# Patient Record
Sex: FEMALE | Employment: PART TIME | URBAN - METROPOLITAN AREA
[De-identification: names, ages, dates, MRNs, and addresses within clinical notes are randomized per-mention and may not be internally consistent; named-entity substitution may affect disease eponyms.]

---

## 2022-03-08 ENCOUNTER — HOSPITAL ENCOUNTER (INPATIENT)
Age: 22
LOS: 1 days | Discharge: HOME | DRG: 881 | End: 2022-03-09
Attending: PSYCHIATRY & NEUROLOGY | Admitting: PSYCHIATRY & NEUROLOGY
Payer: MEDICARE

## 2022-03-08 PROBLEM — F32.A DEPRESSION, UNSPECIFIED: Status: ACTIVE | Noted: 2022-03-08

## 2022-03-08 PROCEDURE — 6370000000 HC RX 637 (ALT 250 FOR IP): Performed by: NURSE PRACTITIONER

## 2022-03-08 PROCEDURE — 1240000000 HC EMOTIONAL WELLNESS R&B

## 2022-03-08 PROCEDURE — 5130000000 HC BRIDGE APPOINTMENT

## 2022-03-08 RX ORDER — DULOXETINE 40 MG/1
40 CAPSULE, DELAYED RELEASE ORAL DAILY
COMMUNITY
Start: 2022-02-16

## 2022-03-08 RX ORDER — METRONIDAZOLE 500 MG/1
500 TABLET ORAL 2 TIMES DAILY
COMMUNITY
Start: 2022-03-08 | End: 2022-03-15

## 2022-03-08 RX ORDER — POLYETHYLENE GLYCOL 3350 17 G
2 POWDER IN PACKET (EA) ORAL
Status: DISCONTINUED | OUTPATIENT
Start: 2022-03-08 | End: 2022-03-10 | Stop reason: HOSPADM

## 2022-03-08 RX ORDER — METRONIDAZOLE 250 MG/1
500 TABLET ORAL 2 TIMES DAILY
Status: DISCONTINUED | OUTPATIENT
Start: 2022-03-08 | End: 2022-03-10 | Stop reason: HOSPADM

## 2022-03-08 RX ORDER — ACETAMINOPHEN 325 MG/1
650 TABLET ORAL EVERY 4 HOURS PRN
Status: DISCONTINUED | OUTPATIENT
Start: 2022-03-08 | End: 2022-03-10 | Stop reason: HOSPADM

## 2022-03-08 RX ORDER — TRAZODONE HYDROCHLORIDE 50 MG/1
50 TABLET ORAL NIGHTLY PRN
Status: DISCONTINUED | OUTPATIENT
Start: 2022-03-08 | End: 2022-03-10 | Stop reason: HOSPADM

## 2022-03-08 ASSESSMENT — SLEEP AND FATIGUE QUESTIONNAIRES
DIFFICULTY STAYING ASLEEP: YES
AVERAGE NUMBER OF SLEEP HOURS: 5
DO YOU USE A SLEEP AID: YES
SLEEP PATTERN: DIFFICULTY FALLING ASLEEP;DISTURBED/INTERRUPTED SLEEP;INSOMNIA
DO YOU HAVE DIFFICULTY SLEEPING: YES
DIFFICULTY ARISING: YES
DIFFICULTY FALLING ASLEEP: YES
RESTFUL SLEEP: NO

## 2022-03-08 ASSESSMENT — LIFESTYLE VARIABLES
HISTORY_ALCOHOL_USE: NO
HISTORY_ALCOHOL_USE: NO

## 2022-03-08 ASSESSMENT — PAIN SCALES - GENERAL
PAINLEVEL_OUTOF10: 0
PAINLEVEL_OUTOF10: 0

## 2022-03-08 NOTE — FLOWSHEET NOTE
03/08/22 1335   Psychiatric History   Psychiatric history treatment   (Therapy @ [facility], monthly)   Are there any medication issues?  No   Support System   Support system Adequate   Types of Support System Friend   Problems in support system Lack of friends/family   Current Living Situation   Home Living Adequate   Living information Lives alone   Problems with living situation  No   Lack of basic needs No   SSDI/SSI SSDI - mental disability   Other government assistance waiting for EBT/food stamps confirmation   Problems with environment denies   Current abuse issues denies at present   Supervised setting None   Relationship problems No   Medical and Self-Care Issues   Relevant medical problems denies   Relevant self-care issues denies   Barriers to treatment No   Family Constellation   Spouse/partner-name/age single   Children-names/ages no kids   Parents Dad - Elle Ram) [phone]   Siblings older sister and brother   Support services Agency involved(Comment)  Clara Valencia)   Childhood   Raised by Biological father   Relevant family history pt unsure   History of abuse No   Legal History   Legal history No   Juvenile legal history No   Substance Use   Use of substances  No   Motivation for SA Treatment   Stage of engagement   (N/A)   Motivation for treatment   (N/A)   Current barriers to treatment Cognitive   Education   Education HS graduate -GED  (12th grade)   Work History   Currently employed Yes  (works at [employer], starts on 3/10/22)   [address] [facility] service   (no service hx)   Leisure/Activity   Past interests phone games, time outdoors, hang out with family   Present interests hang out with friends, listen to music (country), watch youtube, movies   Current daily activity Pt reports waking up, watch youtube/movie, hang out with friends   Social with friends/family Yes   Cultural and Spiritual   Spiritual concerns No   Cultural concerns No     Completed by Delon Gee, MM, MT-BC

## 2022-03-08 NOTE — BH NOTE
[facility]  Admission Note     Admission Type:   Admission Type: Involuntary    Reason for admission:  Reason for Admission: suicidal ideation    PATIENT STRENGTHS:  Strengths: Social Skills,Positive Support    Patient Strengths and Limitations:  Limitations: Difficulty problem solving/relies on others to help solve problems,Perceives need for assistance with self-care,Inappropriate/potentially harmful leisure interests    Addictive Behavior:   Addictive Behavior  In the past 3 months, have you felt or has someone told you that you have a problem with:  : None  Do you have a history of Chemical Use?: No  Do you have a history of Alcohol Use?: No  Do you have a history of Street Drug Abuse?: No  Histroy of Prescripton Drug Abuse?: No    Medical Problems:   History reviewed. No pertinent past medical history.     Status EXAM:  Status and Exam  Normal: No  Facial Expression: Sad  Affect: Congruent,Appropriate (appropriate for situation)  Level of Consciousness: Alert  Mood:Normal: Yes (normal for situation)  Motor Activity:Normal: Yes  Interview Behavior: Cooperative  Preception: [place] to Person,[place] to Time,[place] to Place,[place] to Situation  Attention:Normal: No  Thought Content:Normal: Yes (concrete)  Hallucinations: None  Delusions: No  Memory:Normal: Yes  Insight and Judgment: No  Insight and Judgment: Poor Judgment  Present Suicidal Ideation: No  Present Homicidal Ideation: No    Tobacco Screening:  Practical Counseling, on admission, piedad X, if applicable and completed (first 3 are required if patient doesn't refuse):            (X)  Recognizing danger situations (included triggers and roadblocks)                    (X)  Coping skills (new ways to manage stress, exercise, relaxation techniques, changing routine, distraction)                            (X)  Basic information about quitting (benefits of quitting, techniques in how to quit, available resources  ( ) Referral for counseling faxed to [facility]          ( ) Patient refused counseling  ( ) Patient has not smoked in the last 30 days    Metabolic Screening:    No results found for: LABA1C    No results found for: CHOL  No results found for: TRIG  No results found for: HDL  No components found for: LDLCAL  No results found for: LABVLDL      There is no height or weight on file to calculate BMI. BP Readings from Last 2 Encounters:   03/08/22 119/69           Pt admitted with followings belongings:  Dental Appliances: None  Vision - Corrective Lenses: Glasses  Hearing Aid: Right hearing aid  Jewelry: Earrings,Ring  Body Piercings Removed: No  Clothing: [facility]  Were All Patient Medications Collected?: Not Applicable  Other Valuables: Keys,Cell phone,Wallet ($20, 3 credit/debit cards)     Patient's home medications were  verified. Patient oriented to surroundings and program expectations and copy of patient rights given. Received admission packet:  yes. Consents reviewed, signed yes. Refused Voluntary admission. Patient verbalize understanding:  Yes, would benefit from reinforcement.   Patient education on precautions: yes                   Cecelia Harris RN

## 2022-03-08 NOTE — GROUP NOTE
Group Therapy Note    Date: 3/8/2022    Group Start Time: 1100  Group End Time: 9623  Group Topic: Psychoeducation    [facility]    JANE Faulkner        Group Therapy Note    Clinician prepared a group for strengthening relationships that the members requested in the morning meeting. Clinician went to every door to invite members to the group, turned off the tv, let everyone in the main room know it was group time. No one attended. Clinician came back out 10 minutes later and announced to everyone it was group time for a second time and no one attended. Attendees: 0         Patient's Goal:      Notes:  Patient was invited to group but chose not to attend.      Status After Intervention:  Unchanged    Participation Level: None    Participation Quality: Appropriate      Speech:  none      Thought Process/Content: Logical      Affective Functioning: Congruent      Mood: angry      Level of consciousness:  Alert      Response to Learning: Able to verbalize current knowledge/experience      Endings: None Reported    Modes of Intervention: Education      Discipline Responsible: /Counselor      Signature:  JANE Faulkner

## 2022-03-08 NOTE — BH NOTE
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 States pt takes duloxetine daily.

## 2022-03-09 VITALS
HEART RATE: 68 BPM | OXYGEN SATURATION: 94 % | SYSTOLIC BLOOD PRESSURE: 113 MMHG | TEMPERATURE: 98.2 F | DIASTOLIC BLOOD PRESSURE: 70 MMHG | RESPIRATION RATE: 16 BRPM | BODY MASS INDEX: 31.24 KG/M2 | WEIGHT: 135 LBS | HEIGHT: 55 IN

## 2022-03-09 PROCEDURE — 6370000000 HC RX 637 (ALT 250 FOR IP)

## 2022-03-09 PROCEDURE — 1240000000 HC EMOTIONAL WELLNESS R&B

## 2022-03-09 PROCEDURE — 99221 1ST HOSP IP/OBS SF/LOW 40: CPT | Performed by: NURSE PRACTITIONER

## 2022-03-09 PROCEDURE — 6370000000 HC RX 637 (ALT 250 FOR IP): Performed by: NURSE PRACTITIONER

## 2022-03-09 PROCEDURE — 99236 HOSP IP/OBS SAME DATE HI 85: CPT

## 2022-03-09 RX ORDER — DULOXETIN HYDROCHLORIDE 20 MG/1
40 CAPSULE, DELAYED RELEASE ORAL DAILY
Status: DISCONTINUED | OUTPATIENT
Start: 2022-03-09 | End: 2022-03-10 | Stop reason: HOSPADM

## 2022-03-09 RX ADMIN — DULOXETINE 40 MG: 20 CAPSULE, DELAYED RELEASE ORAL at 17:32

## 2022-03-09 RX ADMIN — METRONIDAZOLE 500 MG: 250 TABLET ORAL at 08:47

## 2022-03-09 NOTE — PLAN OF CARE
Ana is quiet, isolative, guarded and withdrawn tonight. Did not attend PM group. Flat affect. Denies SI/HI and denies hallucinations. Refused her scheduled Flagyl for tonight. No behaviors noted. Will continue to monitor.
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
## 2022-03-09 NOTE — BH NOTE
[id number] [facility]  Treatment Team Note  Review Date & Time: 3/9/22  0918    Patient was not in treatment team      Status EXAM:   Status and Exam  Normal: No  Facial Expression: Avoids Gaze,Flat  Affect: Congruent  Level of Consciousness: Alert  Mood:Normal: No  Mood: Other (Comment) (withdrawn)  Motor Activity:Normal: No  Motor Activity: Decreased  Interview Behavior: Evasive  Preception: [place] to Person,[place] to Time,O[place] to Place,[place] to Situation  Attention:Normal: No  Attention: Distractible  Thought Processes: Blocking  Thought Content:Normal: Yes  Hallucinations: None  Delusions: No  Memory:Normal: Yes  Insight and Judgment: No  Insight and Judgment: Poor Judgment  Present Suicidal Ideation: No  Present Homicidal Ideation: No      Suicide Risk CSSR-S:  1) Within the past month, have you wished you were dead or wished you could go to sleep and not wake up? : No  2) Have you actually had any thoughts of killing yourself? : No  6) Have you ever done anything, started to do anything, or prepared to do anything to end your life?: No      PLAN/TREATMENT RECOMMENDATIONS UPDATE: Patient will take medication as prescribed, eat 75% of meals, attend groups, participate in milieu activities, participate in treatment team and care planning for discharge and follow up.             Chad Babb RN

## 2022-03-09 NOTE — BH NOTE
Purposeful Rounding    Patient Location: Patient room    Patient willing to engage in conversation: Yes    Presentation/behavior: Guarded/Suspicious and Withdrawn    Affect: Flat/blunted    Concerns reported: Refused scheduled Flagyl    PRN medications given: None    Environmental assessment: Room free from clutter, Clear path to bathroom  and Adequate lighting    Fall prevention interventions in place: Lighting appropriate, Room free of clutter and Clear path to bathroom    Daily [place] Fall Risk Score: 53    Daily [place] Fall Risk Score: 0      Electronically signed by Guero Whitt RN on 3/8/22 at 8:54 PM EST

## 2022-03-09 NOTE — PROGRESS NOTES
Behavioral Services  Medicare Certification Upon Admission    I certify that this patient's inpatient psychiatric hospital admission is medically necessary for:    [x] (1) Treatment which could reasonably be expected to improve this patient's condition,       [x] (2) Or for diagnostic study;     AND     [x](2) The inpatient psychiatric services are provided while the individual is under the care of a physician and are included in the individualized plan of care.     Estimated length of stay/service 1-2 days    Plan for post-hospital care follow up with [facility]    Electronically signed by BEKAH Rust CNP on 3/9/2022 at 12:26 PM

## 2022-03-09 NOTE — H&P
Hospital Medicine History & Physical      PCP: [facility]    Date of Admission: 3/8/2022    Date of Service: Pt seen/examined on 3/9/2022     Chief Complaint:  SI    History Of Present Illness: The patient is a 24 y.o. female with no significant PMH who presented to [facility] with complaint of SI. Patient was seen and evaluated in the ED by the ED medical provider, patient was medically cleared for admission to [facility]. This note serves as an admission medical H&P.    PCP: MERON AVILA  Tobacco use: never  ETOH use: denies  Illicit drug use: denies    Patient denies any symptoms/complaints. Past Medical History:    History reviewed. No pertinent past medical history. Past Surgical History:    History reviewed. No pertinent surgical history. Medications Prior to Admission:    Prior to Admission medications    Medication Sig Start Date End Date Taking? Authorizing Provider   metroNIDAZOLE (FLAGYL) 500 MG tablet Take 500 mg by mouth 2 times daily 3/8/22 3/15/22 Yes Historical Provider, MD   DULoxetine HCl 40 MG CPEP Take 40 mg by mouth daily 2/16/22   Historical Provider, MD       Allergies:  Patient has no known allergies. Social History:     TOBACCO:   reports that she has never smoked. She has never used smokeless tobacco.  ETOH:   has no history on file for alcohol use. Family History:   Positive as follows:    History reviewed. No pertinent family history.     REVIEW OF SYSTEMS:       Constitutional: Negative for fever   HENT: Negative for sore throat   Respiratory: Negative  for dyspnea, cough   Cardiovascular: Negative for chest pain   Gastrointestinal: Negative for vomiting, diarrhea   Genitourinary: Negative for dysuria  Musculoskeletal: Negative for arthralgias   Skin: Negative for rash   Neurological: Negative for syncope   Psychiatric/Behavorial: per psychiatric evaluation    PHYSICAL EXAM:    BP (!) 106/52   Pulse 68   Temp 98.7 °F (37.1 °C) (Temporal) Resp 14   SpO2 96%     Gen: No distress. Alert. Eyes: PERRL. No sclera icterus. No conjunctival injection. ENT: No discharge. Pharynx clear. Neck: No JVD. No Carotid Bruit. Trachea midline. Resp: No accessory muscle use. No crackles. No wheezes. No rhonchi. CV: Regular rate. Regular rhythm. No murmur. No rub. No edema. GI: Non-tender. Non-distended. Normal bowel sounds. Skin: Warm and dry. No nodule on exposed extremities. No rash on exposed extremities. M/S: No cyanosis. No joint deformity. No clubbing. Neuro: Awake. No focal neurologic deficit on exam.  Cranial nerves II through XII intact. Patient is able to ambulate without difficulty. Psych: Per psychiatry team evaluation     COVID: negative    ASSESSMENT/PLAN:  Depression  - management per psychiatry. Continue Flagyl for prophylaxis. Pt has no medical complaints at this time. They were informed that should a medical concern arise during their admission they may have [facility] contact us.     [facility] FNP-C  3/9/2022 11:30 AM

## 2022-03-09 NOTE — GROUP NOTE
Group Therapy Note    Date: 3/9/2022    Group Start Time: 1100  Group End Time: 1200  Group Topic: Music Therapy    [facility]        Group Therapy Note    Topic: Coping with things outside our control    Mode of Intervention: [place], Songwriting, Reflective Writing    Song Used: \"Drive\" by [employer]    Attendees: 9         Notes:  Ana was present and attentive across group interventions, minimally engaged but engaging in unstructured/disruptive socialization throughout. No adherence to structure or limits set by group facilitator, inattentive to redirection. During songwriting and personal reflection, pt reported difficulty in reflection, unable to finish sentence \"Sometime, I feel _____. \" Despite constant support and encouragement from peers during session, pt remained unable to engage in reflective writing interventions.     Status After Intervention:  Improved    Participation Level: Interactive    Participation Quality: Inappropriate      Speech:  Loud, Social      Thought Process/Content: Logical      Affective Functioning: Congruent      Mood: Labile      Level of consciousness:  Attentive and Preoccupied      Response to Learning: Capable of insight and Progressing to goal      Endings: None Reported    Modes of Intervention: Education, Support, Socialization, Exploration, Problem-solving, Activity and Media      Discipline Responsible: Psychoeducational Specialist      Signature:  Paolo Dent, MM, MT-BC
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
## 2022-03-10 PROCEDURE — 5130000000 HC BRIDGE APPOINTMENT

## 2022-03-10 NOTE — PROGRESS NOTES
Patient's aunt was at the ED parking lot to  patient. Ana was escorted to the ED parking lot and left with aunt. Patient belongings and valuables were returned to the patient and she verified belongings as intact.

## 2022-03-10 NOTE — PROGRESS NOTES
Bridge Appointment completed: Reviewed Discharge Instructions with patient. Patient verbalizes understanding and agreement with the discharge plan using the teachback method.      Referral for Outpatient Tobacco Cessation Counseling, upon discharge (piedad X if applicable and completed):    ( )  Hospital staff assisted patient to call Quit Line or faxed referral                                   during hospitalization                  ( )  Recognizing danger situations (included triggers and roadblocks), if not completed on admission                    ( )  Coping skills (new ways to manage stress, exercise, relaxation techniques, changing routine, distraction), if not completed on admission                                                           ( )  Basic information about quitting (benefits of quitting, techniques in how to quit, available resources, if not completed on admission  ( ) Referral for counseling faxed to [facility]   ( ) Patient refused referral  ( ) Patient refused counseling  ( ) Patient refused smoking cessation medication upon discharge    Vaccinations (piedad X if applicable and completed):  ( ) Patient states already received influenza vaccine elsewhere  ( ) Patient received influenza vaccine during this hospitalization  ( ) Patient refused influenza vaccine at this time

## 2022-03-10 NOTE — PROGRESS NOTES
...[facility][1]  Discharge Note    Pt discharged with followings belongings:   Dental Appliances: None  Vision - Corrective Lenses: Glasses  Hearing Aid: Right hearing aid  Jewelry: Earrings,Ring  Body Piercings Removed: No  Clothing: [place]  Were All Patient Medications Collected?: Not Applicable  Other Valuables: Keys,Cell phone,Wallet ($20, 3 credit/debit cards)   Valuables were returned to patient. Patient education on aftercare instructions: yes. Went home to the care of aunt. Discharge instructions were provided to patient.  Patient will [facility] to [facility] upon discharge:  Status and Exam  Normal: No  Facial Expression: Flat,Avoids Gaze  Affect: Congruent  Level of Consciousness: Alert  Mood:Normal: No  Mood: Depressed  Motor Activity:Normal: Yes  Motor Activity: Decreased  Interview Behavior: Cooperative  Preception: [place] to Person,[place] to Time,[place] to Place,[place] to Situation  Attention:Normal: No  Attention: Distractible  Thought Processes: Blocking  Thought Content:Normal: Yes  Hallucinations: None  Delusions: No  Memory:Normal: Yes  Insight and Judgment: No  Insight and Judgment: Poor Judgment  Present Suicidal Ideation: No  Present Homicidal Ideation: No      Metabolic Screening:    No results found for: LABA1C    No results found for: CHOL  No results found for: TRIG  No results found for: HDL  No components found for: LDLCAL  No results found for: Katherine Juares RN